# Patient Record
Sex: MALE | Race: WHITE | NOT HISPANIC OR LATINO
[De-identification: names, ages, dates, MRNs, and addresses within clinical notes are randomized per-mention and may not be internally consistent; named-entity substitution may affect disease eponyms.]

---

## 2017-06-19 ENCOUNTER — RX RENEWAL (OUTPATIENT)
Age: 67
End: 2017-06-19

## 2018-04-16 ENCOUNTER — TRANSCRIPTION ENCOUNTER (OUTPATIENT)
Age: 68
End: 2018-04-16

## 2019-07-12 ENCOUNTER — TRANSCRIPTION ENCOUNTER (OUTPATIENT)
Age: 69
End: 2019-07-12

## 2021-02-04 ENCOUNTER — TRANSCRIPTION ENCOUNTER (OUTPATIENT)
Age: 71
End: 2021-02-04

## 2023-02-06 ENCOUNTER — NON-APPOINTMENT (OUTPATIENT)
Age: 73
End: 2023-02-06

## 2023-02-07 ENCOUNTER — APPOINTMENT (OUTPATIENT)
Dept: ORTHOPEDIC SURGERY | Facility: CLINIC | Age: 73
End: 2023-02-07
Payer: MEDICARE

## 2023-02-07 ENCOUNTER — NON-APPOINTMENT (OUTPATIENT)
Age: 73
End: 2023-02-07

## 2023-02-07 VITALS
SYSTOLIC BLOOD PRESSURE: 156 MMHG | TEMPERATURE: 97.3 F | HEART RATE: 112 BPM | BODY MASS INDEX: 26.88 KG/M2 | DIASTOLIC BLOOD PRESSURE: 93 MMHG | HEIGHT: 71 IN | WEIGHT: 192 LBS

## 2023-02-07 DIAGNOSIS — M84.30XA STRESS FRACTURE, UNSPECIFIED SITE, INITIAL ENCOUNTER FOR FRACTURE: ICD-10-CM

## 2023-02-07 DIAGNOSIS — Z86.39 PERSONAL HISTORY OF OTHER ENDOCRINE, NUTRITIONAL AND METABOLIC DISEASE: ICD-10-CM

## 2023-02-07 PROCEDURE — 73630 X-RAY EXAM OF FOOT: CPT | Mod: LT

## 2023-02-07 PROCEDURE — 99202 OFFICE O/P NEW SF 15 MIN: CPT

## 2023-03-02 ENCOUNTER — APPOINTMENT (OUTPATIENT)
Dept: ORTHOPEDIC SURGERY | Facility: CLINIC | Age: 73
End: 2023-03-02
Payer: MEDICARE

## 2023-03-02 DIAGNOSIS — M20.62 ACQUIRED DEFORMITIES OF TOE(S), UNSPECIFIED, LEFT FOOT: ICD-10-CM

## 2023-03-02 DIAGNOSIS — M79.672 PAIN IN LEFT FOOT: ICD-10-CM

## 2023-03-02 DIAGNOSIS — I99.8 OTHER DISORDER OF CIRCULATORY SYSTEM: ICD-10-CM

## 2023-03-02 DIAGNOSIS — M20.32 HALLUX VARUS (ACQUIRED), LEFT FOOT: ICD-10-CM

## 2023-03-02 DIAGNOSIS — M21.42 FLAT FOOT [PES PLANUS] (ACQUIRED), LEFT FOOT: ICD-10-CM

## 2023-03-02 PROCEDURE — 99213 OFFICE O/P EST LOW 20 MIN: CPT

## 2023-03-03 PROBLEM — M79.672 LEFT FOOT PAIN: Status: ACTIVE | Noted: 2023-02-07

## 2023-03-03 PROBLEM — M21.42 ACQUIRED PES PLANUS OF LEFT FOOT: Status: ACTIVE | Noted: 2023-02-07

## 2023-03-03 PROBLEM — M20.62 DEFORMITY OF TOE OF LEFT FOOT: Status: ACTIVE | Noted: 2023-02-07

## 2023-03-03 PROBLEM — I99.8 VASCULAR CALCIFICATION: Status: ACTIVE | Noted: 2023-02-07

## 2023-03-03 PROBLEM — M20.32 ACQUIRED HALLUX VARUS OF LEFT FOOT: Status: ACTIVE | Noted: 2023-02-07

## 2024-01-18 ENCOUNTER — APPOINTMENT (OUTPATIENT)
Dept: ORTHOPEDIC SURGERY | Facility: CLINIC | Age: 74
End: 2024-01-18
Payer: MEDICARE

## 2024-01-18 VITALS — WEIGHT: 175 LBS | BODY MASS INDEX: 24.5 KG/M2 | HEIGHT: 71 IN

## 2024-01-18 DIAGNOSIS — M17.11 UNILATERAL PRIMARY OSTEOARTHRITIS, RIGHT KNEE: ICD-10-CM

## 2024-01-18 PROCEDURE — 99214 OFFICE O/P EST MOD 30 MIN: CPT | Mod: 25

## 2024-01-18 PROCEDURE — 73562 X-RAY EXAM OF KNEE 3: CPT | Mod: LT

## 2024-01-18 PROCEDURE — 20610 DRAIN/INJ JOINT/BURSA W/O US: CPT | Mod: LT

## 2024-03-20 ENCOUNTER — APPOINTMENT (OUTPATIENT)
Dept: ORTHOPEDIC SURGERY | Facility: CLINIC | Age: 74
End: 2024-03-20
Payer: MEDICARE

## 2024-03-20 VITALS — BODY MASS INDEX: 24.5 KG/M2 | WEIGHT: 175 LBS | HEIGHT: 71 IN

## 2024-03-20 PROCEDURE — 73630 X-RAY EXAM OF FOOT: CPT | Mod: RT

## 2024-03-20 PROCEDURE — 73600 X-RAY EXAM OF ANKLE: CPT | Mod: RT

## 2024-03-20 PROCEDURE — 99204 OFFICE O/P NEW MOD 45 MIN: CPT

## 2024-03-20 NOTE — DISCUSSION/SUMMARY
[Surgical risks reviewed] : Surgical risks reviewed [de-identified] : Continue brace/P.T.  Patient is a poor surgicall candidate, would need cardiac/med clearance.  Concerned about recurrent UTI/sepsis    The risks, benefits, alternatives have been discussed.  The risks include but are not limited to infection, bleeding, injury to small nerves and blood vessels, pain, stiffness, progression, dvt, PE, amputation and death.

## 2024-03-20 NOTE — HISTORY OF PRESENT ILLNESS
[0] : 0 [Localized] : localized [de-identified] : Patient here for further evaluation of his right foot. He states his foot/ankle are very stiff. He denies any injury or trauma. He saw an orthopedic last year for his left foot stiffnes and was advised to get braces, but he never did. He also states he was recently hospitalized for sepsis from a UTI.   PMH  DM  A1C 6.6  [FreeTextEntry1] : rt foot [] : no [FreeTextEntry5] : was in PT and couldnt get heel to touch to ground and is getting worse

## 2024-03-28 RX ORDER — HYDROCODONE BITARTRATE AND ACETAMINOPHEN 5; 325 MG/1; MG/1
5-325 TABLET ORAL 3 TIMES DAILY
Qty: 20 | Refills: 0 | Status: ACTIVE | COMMUNITY
Start: 2024-03-28 | End: 1900-01-01

## 2024-04-01 ENCOUNTER — APPOINTMENT (OUTPATIENT)
Age: 74
End: 2024-04-01
Payer: MEDICARE

## 2024-04-01 PROCEDURE — 27612 EXPLORATION OF ANKLE JOINT: CPT | Mod: 59,RT

## 2024-04-01 PROCEDURE — 27860 FIXATION OF ANKLE JOINT: CPT | Mod: 59,RT

## 2024-04-01 PROCEDURE — 29515 APPLICATION SHORT LEG SPLINT: CPT | Mod: 59,RT

## 2024-04-01 PROCEDURE — 27685 REVISION OF LOWER LEG TENDON: CPT | Mod: RT

## 2024-04-11 ENCOUNTER — APPOINTMENT (OUTPATIENT)
Dept: ORTHOPEDIC SURGERY | Facility: CLINIC | Age: 74
End: 2024-04-11
Payer: MEDICARE

## 2024-04-11 PROCEDURE — 99024 POSTOP FOLLOW-UP VISIT: CPT

## 2024-04-11 PROCEDURE — L4361: CPT | Mod: KX,RT

## 2024-04-11 PROCEDURE — L4397: CPT | Mod: KX,RT

## 2024-04-11 NOTE — PHYSICAL EXAM
[Right] : right foot and ankle [Mild] : mild diffused ankle swelling [2+] : dorsalis pedis pulse: 2+ [] : uses wheelchair

## 2024-04-11 NOTE — HISTORY OF PRESENT ILLNESS
[0] : 0 [Localized] : localized [de-identified] : Patient here for further evaluation of his right foot. He states his foot/ankle are very stiff. He denies any injury or trauma. He saw an orthopedic last year for his left foot stiffnes and was advised to get braces, but he never did. He also states he was recently hospitalized for sepsis from a UTI.   PMH  DM  A1C 6.6  4/1/24: R achilles tendon lengthening and PCR   04/11/24: f/u R ankle, NWB in splint. He is in [facility] rehab. HGA1C 6.7%. No fevers or chills.  [] : no [FreeTextEntry1] : rt foot [FreeTextEntry5] : was in PT and couldnt get heel to touch to ground and is getting worse

## 2024-04-16 ENCOUNTER — APPOINTMENT (OUTPATIENT)
Dept: ORTHOPEDIC SURGERY | Facility: CLINIC | Age: 74
End: 2024-04-16
Payer: MEDICARE

## 2024-04-16 DIAGNOSIS — M24.571 CONTRACTURE, RIGHT ANKLE: ICD-10-CM

## 2024-04-16 PROCEDURE — 99024 POSTOP FOLLOW-UP VISIT: CPT

## 2024-04-16 NOTE — HISTORY OF PRESENT ILLNESS
[0] : 0 [Localized] : localized [de-identified] : Patient here for further evaluation of his right foot. He states his foot/ankle are very stiff. He denies any injury or trauma. He saw an orthopedic last year for his left foot stiffnes and was advised to get braces, but he never did. He also states he was recently hospitalized for sepsis from a UTI.   PMH  DM  A1C 6.6  4/1/24: R achilles tendon lengthening and PCR   04/11/24: f/u R ankle, NWB in splint. He is in [facility] rehab. HGA1C 6.7%. No fevers or chills.  04/16/24: Patient is here for PO#2. HE is at [facility] rehab. NWB in boot.   [] : no [FreeTextEntry1] : rt foot [FreeTextEntry5] : was in PT and couldnt get heel to touch to ground and is getting worse

## 2024-04-16 NOTE — DISCUSSION/SUMMARY
[de-identified] : sutures removed and steri strips applied may start WBAT in CAM boot start PT/HEP - no passive dorsiflexon skin care discussed f/u 1 month

## 2024-04-16 NOTE — PHYSICAL EXAM
[Right] : right foot and ankle [Mild] : mild diffused ankle swelling [2+] : dorsalis pedis pulse: 2+ [] : no calf tenderness [FreeTextEntry3] : bruising to the incision site, dorsal foot erythema, no signs of active infection [de-identified] : not assessed

## 2024-05-14 ENCOUNTER — APPOINTMENT (OUTPATIENT)
Dept: ORTHOPEDIC SURGERY | Facility: CLINIC | Age: 74
End: 2024-05-14

## 2024-05-22 ENCOUNTER — NON-APPOINTMENT (OUTPATIENT)
Age: 74
End: 2024-05-22